# Patient Record
Sex: MALE | Race: AMERICAN INDIAN OR ALASKA NATIVE
[De-identification: names, ages, dates, MRNs, and addresses within clinical notes are randomized per-mention and may not be internally consistent; named-entity substitution may affect disease eponyms.]

---

## 2018-05-29 NOTE — EDM.PDOC
ED HPI GENERAL MEDICAL PROBLEM





- General


Chief Complaint: ENT Problem


Stated Complaint: [id number] POSSIBLY STREP THROAT


Time Seen by Provider: 05/29/18 18:54


Source of Information: Reports: Patient


History Limitations: Reports: No Limitations





- History of Present Illness


INITIAL COMMENTS - FREE TEXT/NARRATIVE: 





sore throat & cough since Friday, not getting better


  ** Throat


Pain Score (Numeric/FACES): 4





- Related Data


 Allergies











Allergy/AdvReac Type Severity Reaction Status Date / Time


 


No Known Allergies Allergy   Verified 03/05/16 08:39











Home Meds: 


 Home Meds





. [No Known Home Meds]  03/05/16 [History]











Past Medical History





- Past Health History


Medical/Surgical History: Denies Medical/Surgical History





Social & Family History





- Family History


Family Medical History: Noncontributory





- Tobacco Use


Smoking Status *Q: Never Smoker


Second Hand Smoke Exposure: Yes





- Caffeine Use


Caffeine Use: Reports: Soda





- Living Situation & Occupation


Living situation: Reports: Single, with Family


Occupation: Student





ED ROS ENT





- Review of Systems


Review Of Systems: ROS reveals no pertinent complaints other than HPI.





ED EXAM, ENT





- Physical Exam


Exam: See Below


Exam Limited By: No Limitations


General Appearance: Alert, WD/WN, Mild Distress, Other (upset)


Ears: Hearing Grossly Normal


Nose: Normal Inspection


Mouth/Throat: Pharyngeal Erythema, Tonsillar Erythema, Tonsillar Swelling


Head: Atraumatic


Neck: Non-Tender, Full Range of Motion


Respiratory/Chest: No Respiratory Distress, Lungs Clear, Normal Breath Sounds, 

No Accessory Muscle Use


Cardiovascular: Regular Rate, Rhythm


GI/Abdominal: Soft, Non-Tender


Neurological: Alert, Oriented, Normal Cognition, Normal Gait, No Motor/Sensory 

Deficits


Psychiatric: Flat Affect


Skin: Warm, Dry, Normal Color


Lymphatic: No Adenopathy





Course





- Vital Signs


Last Recorded V/S: 


 Last Vital Signs











Temp  37.0 C   05/29/18 18:34


 


Pulse  92   05/29/18 18:34


 


Resp  16   05/29/18 18:34


 


BP  140/75   05/29/18 18:34


 


Pulse Ox  100   05/29/18 18:34














- Orders/Labs/Meds


Orders: 


 Active Orders 24 hr











 Category Date Time Status


 


 CULTURE STREP A CONFIRMATION [RM] Stat Lab  05/29/18 18:43 Results


 


 STREP SCRN A RAPID W CULT CONF [RM] Stat Lab  05/29/18 18:43 Results


 


 Amoxicillin [Amoxil] Med  05/29/18 19:45 Once





 500 mg PO ONETIME ONE   














- Re-Assessments/Exams


Free Text/Narrative Re-Assessment/Exam: 





05/29/18 19:46


results discussed with pt





Departure





- Departure


Time of Disposition: 19:46


Disposition: Home, Self-Care 01


Condition: Good


Clinical Impression: 


 Tonsillitis








- Discharge Information


Instructions:  Tonsillitis, Easy-to-Read


Referrals: 


PCP,None [Primary Care Provider] - 


Forms:  ED Department Discharge


Additional Instructions: 


1) avoid solid foods next 48 hours


2) have liquids & soft foods.


3) follow up at clinic





rx given;


amox 250mg tid x 30





- My Orders


Last 24 Hours: 


My Active Orders





05/29/18 19:45


Amoxicillin [Amoxil]   500 mg PO ONETIME ONE 














- Assessment/Plan


Last 24 Hours: 


My Active Orders





05/29/18 19:45


Amoxicillin [Amoxil]   500 mg PO ONETIME ONE

## 2019-03-10 NOTE — EDM.PDOC
ED HPI GENERAL MEDICAL PROBLEM





- General


Chief Complaint: Respiratory Problem


Stated Complaint: THROAT AND LUNGS [id number]


Time Seen by Provider: 03/10/19 13:20


Source of Information: Reports: Patient


History Limitations: Reports: No Limitations





- History of Present Illness


INITIAL COMMENTS - FREE TEXT/NARRATIVE: 





been sick few days sister is sick, coughing sore throat feverish.





- Related Data


 Allergies











Allergy/AdvReac Type Severity Reaction Status Date / Time


 


No Known Allergies Allergy   Verified 03/10/19 12:04











Home Meds: 


 Home Meds





. [No Known Home Meds]  03/05/16 [History]











Past Medical History





- Past Health History


Medical/Surgical History: Denies Medical/Surgical History





Social & Family History





- Family History


Family Medical History: Noncontributory





- Tobacco Use


Smoking Status *Q: Never Smoker


Second Hand Smoke Exposure: Yes





- Caffeine Use


Caffeine Use: Reports: Soda





- Recreational Drug Use


Recreational Drug Use: No





- Living Situation & Occupation


Living situation: Reports: Single, with Family


Occupation: Student





ED ROS GENERAL





- Review of Systems


Review Of Systems: ROS reveals no pertinent complaints other than HPI.





ED EXAM, GENERAL





- Physical Exam


Exam: See Below


Exam Limited By: No Limitations


General Appearance: Alert, WD/WN, No Apparent Distress, Anxious


Ears: Hearing Grossly Normal


Throat/Mouth: Normal Voice, No Airway Compromise, Inflammation


Head: Atraumatic


Neck: Non-Tender, Full Range of Motion


Respiratory/Chest: No Respiratory Distress


Cardiovascular: Regular Rate, Rhythm


GI/Abdominal: Soft, Non-Tender


Neurological: Alert, Oriented, Normal Cognition, Normal Gait, No Motor/Sensory 

Deficits


Psychiatric: Flat Affect


Skin Exam: Warm, Dry, Normal Color


Lymphatic: No Adenopathy





Course





- Vital Signs


Last Recorded V/S: 


 Last Vital Signs











Temp  36.3 C   03/10/19 12:00


 


Pulse  89   03/10/19 12:00


 


Resp  18   03/10/19 12:00


 


BP  132/87   03/10/19 12:00


 


Pulse Ox  96   03/10/19 12:00














- Orders/Labs/Meds


Orders: 


 Active Orders 24 hr











 Category Date Time Status


 


 CULTURE STREP A CONFIRMATION [] Stat Lab  03/10/19 12:14 Results


 


 STREP SCRN A RAPID W CULT CONF [RM] Stat Lab  03/10/19 12:14 Results














- Re-Assessments/Exams


Free Text/Narrative Re-Assessment/Exam: 





03/10/19 13:30


results discussed with pt





Departure





- Departure


Time of Disposition: 13:31


Disposition: Home, Self-Care 01


Condition: Good


Clinical Impression: 


 Acute bronchitis with bronchospasm, Tonsillopharyngitis








- Discharge Information


Instructions:  Upper Respiratory Infection, Adult, Easy-to-Read


Forms:  ED Department Discharge


Additional Instructions: 


1) rest


2) drink lots of liquids


3) don't sleep flat at night





rx given;


albuterol 2.5mg solution tid prn


z-connie





- My Orders


Last 24 Hours: 


My Active Orders





03/10/19 12:14


CULTURE STREP A CONFIRMATION [RM] Stat 


STREP SCRN A RAPID W CULT CONF [RM] Stat 














- Assessment/Plan


Last 24 Hours: 


My Active Orders





03/10/19 12:14


CULTURE STREP A CONFIRMATION [RM] Stat 


STREP SCRN A RAPID W CULT CONF [RM] Stat

## 2020-10-17 ENCOUNTER — HOSPITAL ENCOUNTER (EMERGENCY)
Dept: HOSPITAL 43 - DL.ED | Age: 25
Discharge: HOME | End: 2020-10-17
Payer: MEDICAID

## 2020-10-17 DIAGNOSIS — U07.1: Primary | ICD-10-CM

## 2020-10-17 PROCEDURE — U0002 COVID-19 LAB TEST NON-CDC: HCPCS

## 2020-10-24 ENCOUNTER — HOSPITAL ENCOUNTER (INPATIENT)
Dept: HOSPITAL 43 - DL.ED | Age: 25
LOS: 2 days | Discharge: HOME | DRG: 177 | End: 2020-10-26
Attending: INTERNAL MEDICINE | Admitting: INTERNAL MEDICINE
Payer: MEDICAID

## 2020-10-24 DIAGNOSIS — U07.1: Primary | ICD-10-CM

## 2020-10-24 DIAGNOSIS — J12.89: ICD-10-CM

## 2020-10-24 LAB
ANION GAP SERPL CALC-SCNC: 12.1 MEQ/L (ref 7–13)
CHLORIDE SERPL-SCNC: 99 MMOL/L (ref 98–107)
SODIUM SERPL-SCNC: 136 MMOL/L (ref 136–145)

## 2020-10-24 PROCEDURE — XW033E5 INTRODUCTION OF REMDESIVIR ANTI-INFECTIVE INTO PERIPHERAL VEIN, PERCUTANEOUS APPROACH, NEW TECHNOLOGY GROUP 5: ICD-10-PCS | Performed by: INTERNAL MEDICINE

## 2020-10-24 PROCEDURE — 8E0ZXY6 ISOLATION: ICD-10-PCS | Performed by: INTERNAL MEDICINE

## 2020-10-24 RX ADMIN — HEPARIN SODIUM SCH UNITS: 5000 INJECTION, SOLUTION INTRAVENOUS; SUBCUTANEOUS at 23:09

## 2020-10-24 RX ADMIN — LEVOFLOXACIN SCH MLS/HR: 750 INJECTION, SOLUTION INTRAVENOUS at 23:07

## 2020-10-24 NOTE — EDM.PDOC
ED HPI GENERAL MEDICAL PROBLEM





- General


Stated Complaint: COVID - CHEST XRAY


Time Seen by Provider: 10/24/20 18:50


Source of Information: Reports: Patient


History Limitations: Reports: No Limitations





- History of Present Illness


INITIAL COMMENTS - FREE TEXT/NARRATIVE: 





positive covid last Saturday not getting better and now having SOB and chest 

hurts covid nurse told him to come to ER. 








PT FULL CODE





- Related Data


                                    Allergies











Allergy/AdvReac Type Severity Reaction Status Date / Time


 


No Known Allergies Allergy   Verified 03/10/19 12:04











Home Meds: 


                                    Home Meds





. [No Known Home Meds]  03/05/16 [History]











Past Medical History





- Past Health History


Medical/Surgical History: Denies Medical/Surgical History


HEENT History: Reports: None


Cardiovascular History: Reports: None


Respiratory History: Reports: None


Gastrointestinal History: Reports: None


Genitourinary History: Reports: None


Musculoskeletal History: Reports: None


Neurological History: Reports: None


Psychiatric History: Reports: None


Endocrine/Metabolic History: Reports: None


Hematologic History: Reports: None


Immunologic History: Reports: None


Oncologic (Cancer) History: Reports: None


Dermatologic History: Reports: None





- Infectious Disease History


Infectious Disease History: Reports: None





- Past Surgical History


Head Surgeries/Procedures: Reports: None





Social & Family History





- Family History


Family Medical History: Noncontributory





- Caffeine Use


Caffeine Use: Reports: Coffee





- Living Situation & Occupation


Living situation: Reports: Single, with Family


Occupation: Student





ED ROS GENERAL





- Review of Systems


Review Of Systems: Comprehensive ROS is negative, except as noted in HPI.





ED EXAM, GENERAL





- Physical Exam


Exam: See Below


Exam Limited By: No Limitations


General Appearance: Alert, WD/WN, Mild Distress, Other (discomfort)


Ears: Hearing Grossly Normal


Throat/Mouth: Normal Voice, No Airway Compromise


Head: Atraumatic


Neck: Non-Tender, Full Range of Motion


Respiratory/Chest: No Respiratory Distress, No Accessory Muscle Use, Rhonchi


Cardiovascular: Regular Rate, Rhythm


GI/Abdominal: Soft, Non-Tender


 (Male) Exam: Deferred


Rectal (Males) Exam: Deferred


Neurological: Alert, Oriented, Normal Cognition, Normal Gait, No Motor/Sensory D

eficits


Psychiatric: Normal Affect, Normal Mood


Skin Exam: Warm, Dry, Normal Color


Lymphatic: No Adenopathy





Course





- Vital Signs


Last Recorded V/S: 


                                Last Vital Signs











Temp  36.6 C   10/24/20 19:15


 


Pulse  98   10/24/20 19:15


 


Resp  20   10/24/20 19:15


 


BP  129/75   10/24/20 19:15


 


Pulse Ox  92 L  10/24/20 19:15














- Orders/Labs/Meds


Orders: 


                               Active Orders 24 hr











 Category Date Time Status


 


 CULTURE BLOOD [BC] Stat Lab  10/24/20 19:02 Received


 


 Sodium Chloride 0.9% [Normal Saline] 1,000 ml Med  10/24/20 18:45 Active





 IV ASDIRECTED   








                                Medication Orders





Sodium Chloride (Normal Saline)  1,000 mls @ 150 mls/hr IV ASDIRECTED MARIAMA


   Last Admin: 10/24/20 19:09  Dose: 150 mls/hr


   Documented by: ZHOU








Labs: 


                                Laboratory Tests











  10/24/20 10/24/20 10/24/20 Range/Units





  19:02 19:02 19:02 


 


WBC  6.0    (5.0-10.0)  10^3/uL


 


RBC  5.88    (4.6-6.2)  10^6/uL


 


Hgb  16.7    (14.0-18.0)  g/dL


 


Hct  47.5    (40.0-54.0)  %


 


MCV  80.8    ()  fL


 


MCH  28.4    (27.0-34.0)  pg


 


MCHC  35.2 H    (33.0-35.0)  g/dL


 


Plt Count  165    (150-450)  10^3/uL


 


Neut % (Auto)  66.8    (42.2-75.2)  %


 


Lymph % (Auto)  20.6    (20.5-50.1)  %


 


Mono % (Auto)  12.1 H    (2-8)  %


 


Eos % (Auto)  0.3 L    (1.0-3.0)  %


 


Baso % (Auto)  0.2    (0.0-1.0)  %


 


Add Manual Diff  Yes    


 


Neutrophils % (Manual)  63    (42-75)  %


 


Band Neutrophils %  4    %


 


Lymphocytes % (Manual)  22    (20-50)  %


 


Monocytes % (Manual)  10 H    (2-8)  %


 


Eosinophils % (Manual)  1    (1-3)  %


 


Sodium   136   (136-145)  mmol/L


 


Potassium   3.1 L   (3.5-5.1)  mmol/L


 


Chloride   99   ()  mmol/L


 


Carbon Dioxide   28   (21-32)  mmol/L


 


Anion Gap   12.1   (7-13)  mEq/L


 


BUN   21 H   (7-18)  mg/dL


 


Creatinine   0.99   (0.70-1.30)  mg/dL


 


Est Cr Clr Drug Dosing   TNP   


 


Estimated GFR (MDRD)   > 60   


 


BUN/Creatinine Ratio   21.2   (No establ ref range)  


 


Glucose   124 H   (74-99)  mg/dL


 


Lactic Acid    0.9  (0.4-2.0)  mmol/L


 


Calcium   8.3 L   (8.5-10.1)  mg/dL


 


Total Bilirubin   0.9   (0.2-1.0)  mg/dL


 


AST   37   (15-37)  U/L


 


ALT   41   (16-63)  U/L


 


Alkaline Phosphatase   88   ()  U/L


 


Troponin I     (0.000-0.056)  ng/mL


 


Total Protein   7.6   (6.4-8.2)  g/dL


 


Albumin   3.5   (3.4-5.0)  g/dL


 


Globulin   4.1   


 


Albumin/Globulin Ratio   0.9   














  10/24/20 Range/Units





  19:02 


 


WBC   (5.0-10.0)  10^3/uL


 


RBC   (4.6-6.2)  10^6/uL


 


Hgb   (14.0-18.0)  g/dL


 


Hct   (40.0-54.0)  %


 


MCV   ()  fL


 


MCH   (27.0-34.0)  pg


 


MCHC   (33.0-35.0)  g/dL


 


Plt Count   (150-450)  10^3/uL


 


Neut % (Auto)   (42.2-75.2)  %


 


Lymph % (Auto)   (20.5-50.1)  %


 


Mono % (Auto)   (2-8)  %


 


Eos % (Auto)   (1.0-3.0)  %


 


Baso % (Auto)   (0.0-1.0)  %


 


Add Manual Diff   


 


Neutrophils % (Manual)   (42-75)  %


 


Band Neutrophils %   %


 


Lymphocytes % (Manual)   (20-50)  %


 


Monocytes % (Manual)   (2-8)  %


 


Eosinophils % (Manual)   (1-3)  %


 


Sodium   (136-145)  mmol/L


 


Potassium   (3.5-5.1)  mmol/L


 


Chloride   ()  mmol/L


 


Carbon Dioxide   (21-32)  mmol/L


 


Anion Gap   (7-13)  mEq/L


 


BUN   (7-18)  mg/dL


 


Creatinine   (0.70-1.30)  mg/dL


 


Est Cr Clr Drug Dosing   


 


Estimated GFR (MDRD)   


 


BUN/Creatinine Ratio   (No establ ref range)  


 


Glucose   (74-99)  mg/dL


 


Lactic Acid   (0.4-2.0)  mmol/L


 


Calcium   (8.5-10.1)  mg/dL


 


Total Bilirubin   (0.2-1.0)  mg/dL


 


AST   (15-37)  U/L


 


ALT   (16-63)  U/L


 


Alkaline Phosphatase   ()  U/L


 


Troponin I  < 0.017  (0.000-0.056)  ng/mL


 


Total Protein   (6.4-8.2)  g/dL


 


Albumin   (3.4-5.0)  g/dL


 


Globulin   


 


Albumin/Globulin Ratio   











Meds: 


Medications











Generic Name Dose Route Start Last Admin





  Trade Name Freq  PRN Reason Stop Dose Admin


 


Sodium Chloride  1,000 mls @ 150 mls/hr  10/24/20 18:45  10/24/20 19:09





  Normal Saline  IV   150 mls/hr





  ASDIRECTED MARIAMA   Administration














Discontinued Medications














Generic Name Dose Route Start Last Admin





  Trade Name Freq  PRN Reason Stop Dose Admin


 


Dexamethasone  12 mg  10/24/20 18:45  10/24/20 19:09





  Dexamethasone  IVPUSH  10/24/20 18:46  12 mg





  ONETIME ONE   Administration














- Re-Assessments/Exams


Free Text/Narrative Re-Assessment/Exam: 





10/24/20 20:38


case discussed with Dr Kaiser who kindly admitted pt.





Departure





- Departure


Time of Disposition: 20:38


Disposition: Admitted As Inpatient 67


Condition: Good


Clinical Impression: 


 Pneumonia due to COVID-19 virus, Hypokalemia








- Discharge Information


Forms:  ED Department Discharge





Sepsis Event Note (ED)





- Focused Exam


Vital Signs: 


                                   Vital Signs











  Temp Pulse Resp BP Pulse Ox


 


 10/24/20 19:15  36.6 C  98  20  129/75  92 L














- My Orders


Last 24 Hours: 


My Active Orders





10/24/20 18:45


Sodium Chloride 0.9% [Normal Saline] 1,000 ml IV ASDIRECTED 





10/24/20 19:02


CULTURE BLOOD [BC] Stat 














- Assessment/Plan


Last 24 Hours: 


My Active Orders





10/24/20 18:45


Sodium Chloride 0.9% [Normal Saline] 1,000 ml IV ASDIRECTED 





10/24/20 19:02


CULTURE BLOOD [BC] Stat

## 2020-10-24 NOTE — CT
PROCEDURE INFORMATION: 

Exam: CT Chest Without Contrast 

Exam date and time: 10/24/2020 7:24 PM 

Age: 25 years old 

Clinical indication: Cough and shortness of breath; Additional info: R/O covid 

pneumonia 



TECHNIQUE: 

Imaging protocol: Computed tomography of the chest without contrast. 

Radiation optimization: All CT scans at this facility use at least one of these 

dose optimization techniques: automated exposure control; mA and/or kV 

adjustment per patient size (includes targeted exams where dose is matched to 

clinical indication); or iterative reconstruction. 



COMPARISON: 

No relevant prior studies available. 



FINDINGS: 

Lungs: Lung windows show bilateral ground-glass and alveolar opacities, mainly 

seen at the periphery of the lungs and especially in the lower lobes. The 

central airways are widely patent. 

Pleural space: There are no pleural effusions. There is no pneumothorax. 

Heart: The heart size is normal. There is no pericardial effusion. 

Aorta: The thoracic aorta is normal in caliber and contour. 

Lymph nodes: No pathologically enlarged mediastinal, hilar or axillary lymph 

nodes are identified. 



Bones/joints: There is normal alignment throughout the visualized portion of 

the spine. No acute fractures or aggressive bone lesions are identified. 

Soft tissues: Within normal limits. 



IMPRESSION: 

Bilateral peripheral ground-glass and alveolar opacities consistent with 

COVID-19 pneumonia.

## 2020-10-24 NOTE — PCM.HP
H&P History of Present Illness





- General


Date of Service: 10/24/20


Admit Problem/Dx: 


                           Admission Diagnosis/Problem





Admission Diagnosis/Problem      Positive COVID-19 with Pneumonia








Source of Information: Patient, Old Records


History Limitations: Reports: No Limitations





- History of Present Illness


Initial Comments - Free Text/Narative: 


This is  a 26 Y/O M with no significant past medical history came to ER with 

complain of increased shortness of breath enid chest heaviness, he is tested  

positive covid last Saturday ( 10/17/20) , has not received and treatment for 

COVID , not getting better and was told by covid nurse to come to ER. He had CT 

chest in ER and it showed B/L peripheral ground-glass and alveolar opacities 

consistent with COVID-19 pneumonia. He is now admitted for Covid-19 Pneumonia. 





Onset of Symptoms: Reports: Gradual


Duration of Symptoms: Reports: Day(s):


Quality: Reports: Pressure





- Related Data


Allergies/Adverse Reactions: 


                                    Allergies











Allergy/AdvReac Type Severity Reaction Status Date / Time


 


No Known Allergies Allergy   Verified 03/10/19 12:04











Home Medications: 


                                    Home Meds





. [No Known Home Meds]  03/05/16 [History]











Past Medical History





- Past Health History


Medical/Surgical History: Denies Medical/Surgical History


HEENT History: Reports: None


Cardiovascular History: Reports: None


Respiratory History: Reports: None


Gastrointestinal History: Reports: None


Genitourinary History: Reports: None


Musculoskeletal History: Reports: None


Neurological History: Reports: None


Psychiatric History: Reports: None


Endocrine/Metabolic History: Reports: None


Hematologic History: Reports: None


Immunologic History: Reports: None


Oncologic (Cancer) History: Reports: None


Dermatologic History: Reports: None





- Infectious Disease History


Infectious Disease History: Reports: None


Other Infectious Disease History: covid





- Past Surgical History


Head Surgeries/Procedures: Reports: None





Social & Family History





- Family History


Family Medical History: Noncontributory





- Tobacco Use


Tobacco Use Status *Q: Never Tobacco User


Second Hand Smoke Exposure: No





- Caffeine Use


Caffeine Use: Reports: Coffee





- Recreational Drug Use


Recreational Drug Use: No





- Living Situation & Occupation


Living situation: Reports: Single, with Family


Occupation: Student





H&P Review of Systems





- Review of Systems:


Review Of Systems: See Below


General: Reports: Fever (low grade), Weakness


HEENT: Denies: Headaches, Sinus Congestion, Sore Throat, Visual Changes


Cardiovascular: Denies: Chest Pain, Edema, Lightheadedness


Gastrointestinal: Denies: Diarrhea, Difficulty Swallowing, Nausea, Vomiting


Genitourinary: Denies: Dysuria, Burning, Urgency, Flank Pain


Musculoskeletal: Denies: Neck Pain, Shoulder Pain, Leg Pain, Muscle Stiffness


Skin: Denies: Cyanosis, Jaundice, Bruising, Pruritis, Rash


Psychiatric: Denies: Confusion, Anxiety


Neurological: Denies: Confusion, Tremors, Weakness


Hematologic/Lymphatic: Reports: No Symptoms


Immunologic: Reports: No Symptoms





Exam





- Exam


Exam: See Below





- Vital Signs


Vital Signs: 


                                Last Vital Signs











Temp  36.6 C   10/24/20 19:15


 


Pulse  99   10/24/20 20:37


 


Resp  20   10/24/20 20:37


 


BP  129/75   10/24/20 19:15


 


Pulse Ox  96   10/24/20 20:37











Weight: 99.79 kg





- Exam


Quality Assessment: Supplemental Oxygen, DVT Prophylaxis.  No: Urinary Catheter


General: Alert, Oriented, Cooperative


HEENT: Conjunctiva Clear, EOMI, Pupils Equal


Neck: Supple.  No: JVD, Thyromegaly


Lungs: Clear to Auscultation, Normal Respiratory Effort, Decreased Breath Sounds


Cardiovascular: Regular Rate, Regular Rhythm


GI/Abdominal Exam: Normal Bowel Sounds, No Distention.  No: Rigid, Rebound


 (Male) Exam: Deferred


Rectal (Males) Exam: Normal Exam, Normal Rectal Tone, Deferred


Back Exam: Normal Inspection, Full Range of Motion


Extremities: Normal Inspection, No Pedal Edema


Skin: Warm, Intact


Neurological: Cranial Nerves Intact, Reflexes Equal Bilateral


Neuro Extensive - Mental Status: Alert, Oriented x3, Normal Mood/Affect, Memory 

Intact


Neuro Extensive - Motor, Sensory, Reflexes: CN II-XII Intact, Normal Gait, 

Normal Reflexes


Psychiatric: Alert, Normal Affect, Normal Mood





- Patient Data


Lab Results Last 24 hrs: 


                         Laboratory Results - last 24 hr











  10/24/20 10/24/20 10/24/20 Range/Units





  19:02 19:02 19:02 


 


WBC  6.0    (5.0-10.0)  10^3/uL


 


RBC  5.88    (4.6-6.2)  10^6/uL


 


Hgb  16.7    (14.0-18.0)  g/dL


 


Hct  47.5    (40.0-54.0)  %


 


MCV  80.8    ()  fL


 


MCH  28.4    (27.0-34.0)  pg


 


MCHC  35.2 H    (33.0-35.0)  g/dL


 


Plt Count  165    (150-450)  10^3/uL


 


Neut % (Auto)  66.8    (42.2-75.2)  %


 


Lymph % (Auto)  20.6    (20.5-50.1)  %


 


Mono % (Auto)  12.1 H    (2-8)  %


 


Eos % (Auto)  0.3 L    (1.0-3.0)  %


 


Baso % (Auto)  0.2    (0.0-1.0)  %


 


Add Manual Diff  Yes    


 


Neutrophils % (Manual)  63    (42-75)  %


 


Band Neutrophils %  4    %


 


Lymphocytes % (Manual)  22    (20-50)  %


 


Monocytes % (Manual)  10 H    (2-8)  %


 


Eosinophils % (Manual)  1    (1-3)  %


 


Sodium   136   (136-145)  mmol/L


 


Potassium   3.1 L   (3.5-5.1)  mmol/L


 


Chloride   99   ()  mmol/L


 


Carbon Dioxide   28   (21-32)  mmol/L


 


Anion Gap   12.1   (7-13)  mEq/L


 


BUN   21 H   (7-18)  mg/dL


 


Creatinine   0.99   (0.70-1.30)  mg/dL


 


Est Cr Clr Drug Dosing   TNP   


 


Estimated GFR (MDRD)   > 60   


 


BUN/Creatinine Ratio   21.2   (No establ ref range)  


 


Glucose   124 H   (74-99)  mg/dL


 


Lactic Acid    0.9  (0.4-2.0)  mmol/L


 


Calcium   8.3 L   (8.5-10.1)  mg/dL


 


Total Bilirubin   0.9   (0.2-1.0)  mg/dL


 


AST   37   (15-37)  U/L


 


ALT   41   (16-63)  U/L


 


Alkaline Phosphatase   88   ()  U/L


 


Troponin I     (0.000-0.056)  ng/mL


 


Total Protein   7.6   (6.4-8.2)  g/dL


 


Albumin   3.5   (3.4-5.0)  g/dL


 


Globulin   4.1   


 


Albumin/Globulin Ratio   0.9   














  10/24/20 Range/Units





  19:02 


 


WBC   (5.0-10.0)  10^3/uL


 


RBC   (4.6-6.2)  10^6/uL


 


Hgb   (14.0-18.0)  g/dL


 


Hct   (40.0-54.0)  %


 


MCV   ()  fL


 


MCH   (27.0-34.0)  pg


 


MCHC   (33.0-35.0)  g/dL


 


Plt Count   (150-450)  10^3/uL


 


Neut % (Auto)   (42.2-75.2)  %


 


Lymph % (Auto)   (20.5-50.1)  %


 


Mono % (Auto)   (2-8)  %


 


Eos % (Auto)   (1.0-3.0)  %


 


Baso % (Auto)   (0.0-1.0)  %


 


Add Manual Diff   


 


Neutrophils % (Manual)   (42-75)  %


 


Band Neutrophils %   %


 


Lymphocytes % (Manual)   (20-50)  %


 


Monocytes % (Manual)   (2-8)  %


 


Eosinophils % (Manual)   (1-3)  %


 


Sodium   (136-145)  mmol/L


 


Potassium   (3.5-5.1)  mmol/L


 


Chloride   ()  mmol/L


 


Carbon Dioxide   (21-32)  mmol/L


 


Anion Gap   (7-13)  mEq/L


 


BUN   (7-18)  mg/dL


 


Creatinine   (0.70-1.30)  mg/dL


 


Est Cr Clr Drug Dosing   


 


Estimated GFR (MDRD)   


 


BUN/Creatinine Ratio   (No establ ref range)  


 


Glucose   (74-99)  mg/dL


 


Lactic Acid   (0.4-2.0)  mmol/L


 


Calcium   (8.5-10.1)  mg/dL


 


Total Bilirubin   (0.2-1.0)  mg/dL


 


AST   (15-37)  U/L


 


ALT   (16-63)  U/L


 


Alkaline Phosphatase   ()  U/L


 


Troponin I  < 0.017  (0.000-0.056)  ng/mL


 


Total Protein   (6.4-8.2)  g/dL


 


Albumin   (3.4-5.0)  g/dL


 


Globulin   


 


Albumin/Globulin Ratio   











Result Diagrams: 


                                 10/24/20 19:02





                                 10/24/20 19:02





- Problem List


(1) Pneumonia due to COVID-19 virus


SNOMED Code(s): 837676585179755942


   ICD Code: U07.1 - COVID-19; J12.89 - OTHER VIRAL PNEUMONIA   Status: Acute   

Current Visit: No   


Problem List Initiated/Reviewed/Updated: Yes


Orders Last 24hrs: 


                               Active Orders 24 hr











 Category Date Time Status


 


 Admission Diagnosis [ADT] Stat ADT  10/24/20 20:39 Ordered


 


 Admission Status [Patient Status] [ADT] Routine ADT  10/24/20 20:39 Active


 


 CULTURE BLOOD [BC] Stat Lab  10/24/20 19:02 Received


 


 Sodium Chloride 0.9% [Normal Saline] 1,000 ml Med  10/24/20 18:45 Active





 IV ASDIRECTED   








                                Medication Orders





Sodium Chloride (Normal Saline)  1,000 mls @ 150 mls/hr IV ASDIRECTED [facility]


   Last Admin: 10/24/20 19:09  Dose: 150 mls/hr


   Documented by: ZHOU








Assessment/Plan Comment:: 


This is  a 26 Y/O M with no significant past medical history came to ER with 

complain of increased shortness of breath enid chest heaviness, he is tested  

positive covid last Saturday ( 10/17/20) , has not received and treatment for 

COVID , not getting better and was told by covid nurse to come to ER. He had CT 

chest in ER and it showed B/L peripheral ground-glass and alveolar opacities 

consistent with COVID-19 pneumonia. He is now admitted for Covid-19 Pneumonia. 








Impression and Plan:


1. Covid-19 pneumonia: The pt was dxed with COVOD-19 on last Saturday ( 

10/17/20) and has not received any treatment but now his symptom getting worseCT

chest consistent with COVID-19 pneumonia


-Will admit in isolation room


-Will contiue NC oxygen to keep 02 sat >90


-Discuss with pt about starting treatment with Redisivir and Dexamethasone


-He has received a dose of Dexamethasone 12 mg IV push in ER


-Will also start Levofloxacin 750 mg IV daily


-Will check daily Covid Lab








2. Hypokalemia: Will give potassium chloride 40 meq now and another 20 meq 2 hrs

larer


-Recheck potassium in AM





3. DVT prophylaxis: Heparin 5000 units TID





Code Status: Full code

## 2020-10-25 LAB
ANION GAP SERPL CALC-SCNC: 11.1 MEQ/L (ref 7–13)
CHLORIDE SERPL-SCNC: 103 MMOL/L (ref 98–107)
SODIUM SERPL-SCNC: 139 MMOL/L (ref 136–145)

## 2020-10-25 RX ADMIN — HEPARIN SODIUM SCH UNITS: 5000 INJECTION, SOLUTION INTRAVENOUS; SUBCUTANEOUS at 13:44

## 2020-10-25 RX ADMIN — HEPARIN SODIUM SCH UNITS: 5000 INJECTION, SOLUTION INTRAVENOUS; SUBCUTANEOUS at 05:46

## 2020-10-25 RX ADMIN — DEXAMETHASONE SODIUM PHOSPHATE SCH MG: 4 INJECTION, SOLUTION INTRAMUSCULAR; INTRAVENOUS at 08:34

## 2020-10-25 RX ADMIN — LEVOFLOXACIN SCH MLS/HR: 750 INJECTION, SOLUTION INTRAVENOUS at 21:57

## 2020-10-25 RX ADMIN — HEPARIN SODIUM SCH UNITS: 5000 INJECTION, SOLUTION INTRAVENOUS; SUBCUTANEOUS at 21:57

## 2020-10-25 NOTE — PCM.PN
- General Info


Date of Service: 10/25/20


Admission Dx/Problem (Free Text): 


                           Admission Diagnosis/Problem





Admission Diagnosis/Problem      Positive COVID-19 with Pneumonia








Subjective Update: 


Pt was seen in Isolation room, He is doing well, No nausea or vomiting still on 

NC oxygen, no fever or chill





Functional Status: Reports: Pain Controlled, Tolerating Diet, Ambulating, 

Urinating





- Review of Systems


General: Reports: Malaise, Appetite (good).  Denies: Fever


HEENT: Denies: Headaches, Sinus Congestion, Sore Throat, Visual Changes


Pulmonary: Reports: Shortness of Breath.  Denies: Cough, Wheezing


Cardiovascular: Denies: Chest Pain, Dyspnea on Exertion, Lightheadedness


Gastrointestinal: Denies: Abdominal Pain, Difficulty Swallowing, Nausea, 

Vomiting


Genitourinary: Denies: Dysuria, Frequency, Flank Pain


Musculoskeletal: Denies: Neck Pain, Leg Pain, Foot Pain, Joint Swelling


Skin: Denies: Cyanosis, Jaundice, Diaphoresis, Bruising, Pruritis, Rash


Neurological: Denies: Confusion, Numbness, Tremors


Psychiatric: Denies: Confusion, Anxiety





- Patient Data


Vitals - Most Recent: 


                                Last Vital Signs











Temp  37.2 C   10/25/20 12:00


 


Pulse  89   10/25/20 12:00


 


Resp  20   10/25/20 12:00


 


BP  126/80   10/25/20 12:00


 


Pulse Ox  97   10/25/20 12:00











Weight - Most Recent: 99.79 kg


I&O - Last 24 Hours: 


                                 Intake & Output











 10/24/20 10/25/20 10/25/20





 22:59 06:59 14:59


 


Intake Total  1480 360


 


Balance  1480 360











Lab Results Last 24 Hours: 


                         Laboratory Results - last 24 hr











  10/24/20 10/24/20 10/24/20 Range/Units





  19:02 19:02 19:02 


 


WBC  6.0    (5.0-10.0)  10^3/uL


 


RBC  5.88    (4.6-6.2)  10^6/uL


 


Hgb  16.7    (14.0-18.0)  g/dL


 


Hct  47.5    (40.0-54.0)  %


 


MCV  80.8    ()  fL


 


MCH  28.4    (27.0-34.0)  pg


 


MCHC  35.2 H    (33.0-35.0)  g/dL


 


Plt Count  165    (150-450)  10^3/uL


 


Neut % (Auto)  66.8    (42.2-75.2)  %


 


Lymph % (Auto)  20.6    (20.5-50.1)  %


 


Mono % (Auto)  12.1 H    (2-8)  %


 


Eos % (Auto)  0.3 L    (1.0-3.0)  %


 


Baso % (Auto)  0.2    (0.0-1.0)  %


 


Add Manual Diff  Yes    


 


Neutrophils % (Manual)  63    (42-75)  %


 


Band Neutrophils %  4    %


 


Lymphocytes % (Manual)  22    (20-50)  %


 


Monocytes % (Manual)  10 H    (2-8)  %


 


Eosinophils % (Manual)  1    (1-3)  %


 


Sodium   136   (136-145)  mmol/L


 


Potassium   3.1 L   (3.5-5.1)  mmol/L


 


Chloride   99   ()  mmol/L


 


Carbon Dioxide   28   (21-32)  mmol/L


 


Anion Gap   12.1   (7-13)  mEq/L


 


BUN   21 H   (7-18)  mg/dL


 


Creatinine   0.99   (0.70-1.30)  mg/dL


 


Est Cr Clr Drug Dosing   TNP   


 


Estimated GFR (MDRD)   > 60   


 


BUN/Creatinine Ratio   21.2   (No establ ref range)  


 


Glucose   124 H   (74-99)  mg/dL


 


Lactic Acid    0.9  (0.4-2.0)  mmol/L


 


Calcium   8.3 L   (8.5-10.1)  mg/dL


 


Total Bilirubin   0.9   (0.2-1.0)  mg/dL


 


AST   37   (15-37)  U/L


 


ALT   41   (16-63)  U/L


 


Alkaline Phosphatase   88   ()  U/L


 


Troponin I     (0.000-0.056)  ng/mL


 


Total Protein   7.6   (6.4-8.2)  g/dL


 


Albumin   3.5   (3.4-5.0)  g/dL


 


Globulin   4.1   


 


Albumin/Globulin Ratio   0.9   














  10/24/20 10/25/20 10/25/20 Range/Units





  19:02 06:18 06:18 


 


WBC   3.6 L   (5.0-10.0)  10^3/uL


 


RBC   5.61   (4.6-6.2)  10^6/uL


 


Hgb   15.7   (14.0-18.0)  g/dL


 


Hct   45.7   (40.0-54.0)  %


 


MCV   81.5   ()  fL


 


MCH   28.0   (27.0-34.0)  pg


 


MCHC   34.4   (33.0-35.0)  g/dL


 


Plt Count   183   (150-450)  10^3/uL


 


Neut % (Auto)   63.6   (42.2-75.2)  %


 


Lymph % (Auto)   21.5   (20.5-50.1)  %


 


Mono % (Auto)   14.6 H   (2-8)  %


 


Eos % (Auto)   0.0 L   (1.0-3.0)  %


 


Baso % (Auto)   0.3   (0.0-1.0)  %


 


Add Manual Diff     


 


Neutrophils % (Manual)     (42-75)  %


 


Band Neutrophils %     %


 


Lymphocytes % (Manual)     (20-50)  %


 


Monocytes % (Manual)     (2-8)  %


 


Eosinophils % (Manual)     (1-3)  %


 


Sodium    139  (136-145)  mmol/L


 


Potassium    4.1  (3.5-5.1)  mmol/L


 


Chloride    103  ()  mmol/L


 


Carbon Dioxide    29  (21-32)  mmol/L


 


Anion Gap    11.1  (7-13)  mEq/L


 


BUN    20 H  (7-18)  mg/dL


 


Creatinine    0.88  (0.70-1.30)  mg/dL


 


Est Cr Clr Drug Dosing    150.49  


 


Estimated GFR (MDRD)    > 60  


 


BUN/Creatinine Ratio     (No establ ref range)  


 


Glucose    116 H  (74-99)  mg/dL


 


Lactic Acid     (0.4-2.0)  mmol/L


 


Calcium    8.3 L  (8.5-10.1)  mg/dL


 


Total Bilirubin     (0.2-1.0)  mg/dL


 


AST     (15-37)  U/L


 


ALT     (16-63)  U/L


 


Alkaline Phosphatase     ()  U/L


 


Troponin I  < 0.017    (0.000-0.056)  ng/mL


 


Total Protein     (6.4-8.2)  g/dL


 


Albumin     (3.4-5.0)  g/dL


 


Globulin     


 


Albumin/Globulin Ratio     











Med Orders - Current: 


                               Current Medications





Acetaminophen (Tylenol)  650 mg PO Q4H PRN


   PRN Reason: Pain (mild 1-3 )/fever


   Last Admin: 10/24/20 23:08 Dose:  650 mg


   Documented by: 


Dexamethasone (Dexamethasone)  6 mg IVPUSH DAILY [facility]


   Last Admin: 10/25/20 08:34 Dose:  6 mg


   Documented by: 


Docusate Sodium (Colace)  100 mg PO DAILY PRN


   PRN Reason: Constipation


Heparin Sodium (Porcine) (Heparin Sodium)  5,000 units SUBCUT Q8H [facility]


   Last Admin: 10/25/20 13:44 Dose:  5,000 units


   Documented by: 


Sodium Chloride (Normal Saline)  1,000 mls @ 150 mls/hr IV ASDIRECTED [facility]


   Last Admin: 10/25/20 05:45 Dose:  150 mls/hr


   Documented by: 


Levofloxacin/Dextrose 750 mg/ (Premix)  150 mls @ 100 mls/hr IV Q24H [facility]


   Last Admin: 10/24/20 23:07 Dose:  100 mls/hr


   Documented by: 


Remdesivir 100 mg/ Sodium (Chloride)  230 mls @ 230 mls/hr IV Q24H [facility]


   Stop: 10/28/20 21:59





Discontinued Medications





Dexamethasone (Dexamethasone)  12 mg IVPUSH ONETIME ONE


   Stop: 10/24/20 18:46


   Last Admin: 10/24/20 19:09 Dose:  12 mg


   Documented by: 


Remdesivir 200 mg/ Sodium (Chloride)  210 mls @ 210 mls/hr IV ONETIME ONE


   Stop: 10/24/20 22:11


   Last Admin: 10/24/20 23:06 Dose:  210 mls/hr


   Documented by: 


Potassium Chloride (Klor-Con 10)  40 meq PO ONETIME ONE


   Stop: 10/24/20 22:22


   Last Admin: 10/24/20 23:08 Dose:  40 meq


   Documented by: 











- Exam


Quality Assessment: Supplemental Oxygen, DVT Prophylaxis.  No: Central 

Line/PICC, Urine Catheter


General: Alert, Oriented, Cooperative, No Acute Distress


HEENT: Pupils Equal, Pupils Reactive, EOMI, Mucous Membr. Moist/Pink


Neck: Supple, No JVD, No Thyromegaly


Lungs: Clear to Auscultation, Normal Respiratory Effort, Decreased Breath Sounds


Cardiovascular: Regular Rate, Regular Rhythm, No Murmurs


GI/Abdominal Exam: Normal Bowel Sounds, Non-Tender, No Distention.  No: Rebound


 (Male) Exam: Deferred


Back Exam: Normal Inspection, Full Range of Motion


Extremities: Normal Inspection, No Pedal Edema


Skin: Warm, Dry, Intact


Neurological: No New Focal Deficit


Psy/Mental Status: Alert, Normal Affect, Normal Mood





Sepsis Event Note





- Evaluation


Sepsis Screening Result: No Definite Risk





- Focused Exam


Vital Signs: 


                                   Vital Signs











  Temp Pulse Resp BP BP Pulse Ox


 


 10/25/20 12:00  37.2 C  89  20  126/80   97


 


 10/25/20 08:00  36.7 C  83  20  120/74   96


 


 10/25/20 04:00  37.2 C  80  20   134/68  95














- Problem List & Annotations


(1) Pneumonia due to COVID-19 virus


SNOMED Code(s): 263725573855105325


   Code(s): U07.1 - COVID-19; J12.89 - OTHER VIRAL PNEUMONIA   Status: Acute   

Current Visit: No   





- Problem List Review


Problem List Initiated/Reviewed/Updated: Yes





- My Orders


Last 24 Hours: 


My Active Orders





10/24/20 21:47


Patient Status [ADT] Routine 


Up With Assistance [RC] ASDIRECTED 


Vital Signs [RC] 00,04,08,12,16,20 


Acetaminophen [TylenoL]   650 mg PO Q4H PRN 


Docusate Sodium [Colace]   100 mg PO DAILY PRN 


DVT Prophylaxis Not on Coumadin Therapy [AST] Routine 


DVT/VTE Prophylaxis Reflex [OM.PC] Routine 


Resuscitation Status Routine 





10/24/20 21:49


Oxygen Therapy [RC] CONTINUOUS 


Pulse Oximetry [RC] PRN 





10/24/20 21:50


Antiembolic Devices [RC] .Routine 


VTE/DVT Education [RC] PER UNIT ROUTINE 





10/24/20 22:00


Heparin Sodium   5,000 units SUBCUT Q8H 


Levofloxacin/Dextrose 5%-Water [Levaquin in D5W 750 MG/150 ML] 750 mg   Premix 

Bag 1 bag IV Q24H 





10/24/20 22:13


Isolation [COMM] Routine 





10/24/20 22:52


Incentive Spirometry [RT Incentive Spirometry] [RC] Q1HWA 





10/24/20 22:53


Flutter Valve Therapy [RT Chest Physiotherapy] [RC] Q1HWA 





10/25/20 09:00


dexAMETHasone [Dexamethasone]   6 mg IVPUSH DAILY 





10/25/20 21:00


Remdesivir (Eua) [Remdesivir (EUA)] 100 mg   Sodium Chloride 0.9% [Normal 

Saline] 230 ml IV Q24H 














- Plan


Plan:: 


This is  a 24 Y/O M with no significant past medical history came to ER with 

complain of increased shortness of breath enid chest heaviness, he is tested  

positive covid last Saturday ( 10/17/20) , has not received and treatment for 

COVID , not getting better and was told by covid nurse to come to ER. He had CT 

chest in ER and it showed B/L peripheral ground-glass and alveolar opacities 

consistent with COVID-19 pneumonia. He is now admitted for Covid-19 Pneumonia. 








Impression and Plan:


1. Covid-19 pneumonia: The pt was dxed with COVOD-19 on last Saturday ( 

10/17/20) and has not received any treatment but now his symptom getting worse 

CT chest consistent with COVID-19 pneumonia


--Will contiue NC oxygen to keep 02 sat >90 and wean off as tolerated


-Discuss with pt about starting treatment with Redisivir and Dexamethasone and 

he agreed to have those treatment


-He has received a dose of Dexamethasone 12 mg IV push in ER and will continue 

with 6 mg IV daily


-Will also continue Levofloxacin 750 mg IV daily


-Will check daily Covid Lab


-Will likely be able to go home on Monday








2. Hypokalemia: Has received potassium chloride 60 meq  and recheck potassium is

acceptable





3. DVT prophylaxis: Heparin 5000 units TID





Code Status: Full code

## 2020-10-26 VITALS — SYSTOLIC BLOOD PRESSURE: 120 MMHG | DIASTOLIC BLOOD PRESSURE: 75 MMHG | HEART RATE: 79 BPM

## 2020-10-26 RX ADMIN — DEXAMETHASONE SODIUM PHOSPHATE SCH MG: 4 INJECTION, SOLUTION INTRAMUSCULAR; INTRAVENOUS at 09:30

## 2020-10-26 RX ADMIN — HEPARIN SODIUM SCH UNITS: 5000 INJECTION, SOLUTION INTRAVENOUS; SUBCUTANEOUS at 06:04

## 2020-10-26 RX ADMIN — HEPARIN SODIUM SCH: 5000 INJECTION, SOLUTION INTRAVENOUS; SUBCUTANEOUS at 14:26

## 2020-10-26 NOTE — [id number].DCSUM1
**Discharge Summary





- Hospital Course


Free Text/Narrative:: 





25-year-old male with no significant past medical history came to ER with 

complain of increased shortness of breath and chest heaviness.  Patient had 

tested positive for COVID-19 on Saturday ( 10/17/20).  Patient was hypoxic and 

required up to 2 L of oxygen by nasal cannula.  CT scan showed bilateral 

peripheral groundglass and alveolar opacities consistent with COVID-19 

pneumonia.  Received treatment with remdesivir and dexamethasone.  Clinical 

status improved.





Diagnosis: Stroke: No





- Discharge Data


Discharge Date: 10/26/20


Discharge Disposition: Home, Self-Care 01


Condition: Good





- Referral to Home Health


Primary Care Physician: 


PCP None








- Discharge Plan


*PRESCRIPTION DRUG MONITORING PROGRAM REVIEWED*: Not Applicable


*COPY OF PRESCRIPTION DRUG MONITORING REPORT IN PATIENT BRAD: Not Applicable


Prescriptions/Med Rec: 


Amoxicillin/Clavulanate K [Augmentin 875-125 MG] 1 tab PO BID #10 tablet


dexAMETHasone [Dexamethasone] 6 mg PO DAILY #7 tab


Rivaroxaban [Xarelto] 10 mg PO DAILY #30 tab


Home Medications: 


                                    Home Meds





Amoxicillin/Clavulanate K [Augmentin 875-125 MG] 1 tab PO BID #10 tablet 

10/26/20 [Rx]


Rivaroxaban [Xarelto] 10 mg PO DAILY #30 tab 10/26/20 [Rx]


dexAMETHasone [Dexamethasone] 6 mg PO DAILY #7 tab 10/26/20 [Rx]








Patient Handouts:  COVID-19 Frequently Asked Questions, COVID-19: How to Protect

 Yourself and Others - CDC, Infection Prevention in the Home, Prevent the Spread

 of COVID-19 if You Are Sick - [facility]


Referrals: 


PCP,None [Primary Care Provider] - 





- Discharge Summary/Plan Comment


DC Time >30 min.: Yes





- General Info


Admission Dx/Problem (Free Text: 


                           Admission Diagnosis/Problem





Admission Diagnosis/Problem      Positive COVID-19 with Pneumonia








Subjective Update: 


Seen and examined today.  No longer requiring oxygen.  Still coughing.  Denies 

chest pain.  Afebrile overnight.





Functional Status: Reports: Pain Controlled





- Review of Systems


General: Reports: No Symptoms


HEENT: Reports: No Symptoms


Pulmonary: Reports: Cough


Cardiovascular: Reports: No Symptoms


Gastrointestinal: Reports: No Symptoms


Genitourinary: Reports: No Symptoms


Musculoskeletal: Reports: No Symptoms


Skin: Reports: No Symptoms


Neurological: Reports: No Symptoms


Psychiatric: Reports: No Symptoms





- Patient Data


Vitals - Most Recent: 


                                Last Vital Signs











Temp  98.3 F   10/26/20 07:56


 


Pulse  79   10/26/20 07:56


 


Resp  18   10/26/20 07:56


 


BP  120/75   10/26/20 07:56


 


Pulse Ox  95   10/26/20 07:56











Weight - Most Recent: 220 lb


I&O - Last 24 hours: 


                                 Intake & Output











 10/25/20 10/26/20 10/26/20





 22:59 06:59 14:59


 


Intake Total 240 750 


 


Balance 240 750 











Lab Results - Last 24 hrs: 


                         Laboratory Results - last 24 hr











  10/26/20 10/26/20 Range/Units





  11:28 11:28 


 


D-Dimer, Quantitative  276   (0-400)  ng/mL


 


Lactate Dehydrogenase   225  ()  U/L











TONO Results - Last 24 hrs: 


                                  Microbiology











 10/24/20 19:02 Aerobic Blood Culture - Preliminary





 Blood - Venous - Iv Start    NO GROWTH AFTER 1 DAY





 Anaerobic Blood Culture - Preliminary





    NO GROWTH AFTER 1 DAY











Med Orders - Current: 


                               Current Medications





Acetaminophen (Tylenol)  650 mg PO Q4H PRN


   PRN Reason: Pain (mild 1-3 )/fever


   Last Admin: 10/25/20 23:36 Dose:  650 mg


   Documented by: 


Dexamethasone (Dexamethasone)  6 mg IVPUSH DAILY [facility]


   Last Admin: 10/26/20 09:30 Dose:  6 mg


   Documented by: 


Docusate Sodium (Colace)  100 mg PO DAILY PRN


   PRN Reason: Constipation


Heparin Sodium (Porcine) (Heparin Sodium)  5,000 units SUBCUT Q8H [facility]


   Last Admin: 10/26/20 06:04 Dose:  5,000 units


   Documented by: 


Levofloxacin/Dextrose 750 mg/ (Premix)  150 mls @ 100 mls/hr IV Q24H [facility]


   Last Infusion: 10/25/20 23:50 Dose:  Infused


   Documented by: 


Remdesivir 100 mg/ Sodium (Chloride)  230 mls @ 230 mls/hr IV Q24H [facility]


   Stop: 10/28/20 21:59


   Last Infusion: 10/25/20 21:55 Dose:  Infused


   Documented by: 


Sodium Chloride (Saline Flush)  10 ml FLUSH DAILY PRN


   PRN Reason: IV Use


   Last Admin: 10/25/20 21:56 Dose:  10 ml


   Documented by: 


Sodium Chloride (Saline Flush)  30 ml FLUSH DAILY@2200 [facility]


   Stop: 10/28/20 22:01





Discontinued Medications





Dexamethasone (Dexamethasone)  12 mg IVPUSH ONETIME ONE


   Stop: 10/24/20 18:46


   Last Admin: 10/24/20 19:09 Dose:  12 mg


   Documented by: 


Sodium Chloride (Normal Saline)  1,000 mls @ 150 mls/hr IV ASDIRECTED [facility]


   Last Admin: 10/26/20 04:05 Dose:  150 mls/hr


   Documented by: 


Remdesivir 200 mg/ Sodium (Chloride)  210 mls @ 210 mls/hr IV ONETIME ONE


   Stop: 10/24/20 22:11


   Last Admin: 10/24/20 23:06 Dose:  210 mls/hr


   Documented by: 


Potassium Chloride (Klor-Con 10)  40 meq PO ONETIME ONE


   Stop: 10/24/20 22:22


   Last Admin: 10/24/20 23:08 Dose:  40 meq


   Documented by: 











- Exam


General: Reports: Alert, Oriented


HEENT: Reports: Pupils Equal, Pupils Reactive, EOMI, Mucous Membr. Moist/Pink


Neck: Reports: Supple


Lungs: Reports: Clear to Auscultation, Normal Respiratory Effort


Cardiovascular: Reports: Regular Rate, Regular Rhythm


GI/Abdominal Exam: Normal Bowel Sounds, Soft, Non-Tender, No Organomegaly, No 

Distention, No Abnormal Bruit, No Mass, Pelvis Stable


Back Exam: Reports: Normal Inspection, Full Range of Motion


Extremities: Normal Inspection, Normal Range of Motion, Non-Tender, No Pedal Pierre

ma, Normal Capillary Refill


Skin: Reports: Warm, Dry, Intact


Neurological: Reports: No New Focal Deficit


Psy/Mental Status: Reports: Alert, Normal Affect, Normal Mood

## 2021-12-25 ENCOUNTER — HOSPITAL ENCOUNTER (EMERGENCY)
Dept: HOSPITAL 43 - DL.ED | Age: 26
Discharge: HOME | End: 2021-12-25
Payer: COMMERCIAL

## 2021-12-25 VITALS — SYSTOLIC BLOOD PRESSURE: 156 MMHG | DIASTOLIC BLOOD PRESSURE: 89 MMHG | HEART RATE: 106 BPM

## 2021-12-25 DIAGNOSIS — J06.9: Primary | ICD-10-CM

## 2021-12-25 DIAGNOSIS — Z20.822: ICD-10-CM

## 2021-12-25 LAB — SARS-COV-2 RNA RESP QL NAA+PROBE: NEGATIVE

## 2021-12-25 PROCEDURE — 0240U: CPT

## 2021-12-25 PROCEDURE — 99283 EMERGENCY DEPT VISIT LOW MDM: CPT

## 2021-12-25 NOTE — EDM.PDOC
ED HPI GENERAL MEDICAL PROBLEM





- General


Chief Complaint: Fever


Stated Complaint: COUGH


Time Seen by Provider: 12/25/21 14:38


Source of Information: Reports: Patient, Old Records, RN, RN Notes Reviewed


History Limitations: Reports: No Limitations





- History of Present Illness


INITIAL COMMENTS - FREE TEXT/NARRATIVE: 


Pt presents to ER with c/o cough, fever, runny nose, and body aches that began 

yesterday. Denies chest pain, abdominal pain, N/V/D, or rash. He states he had 

COVID about 1 year ago.


Onset: Sudden


Onset Date: 12/24/21


Duration: Constant


Location: Reports: Generalized


Quality: Reports: Ache


Severity: Moderate


Improves with: Reports: None


Worsens with: Reports: None


Context: Reports: Sick Contact


Associated Symptoms: Reports: No Other Symptoms





- Related Data


                                    Allergies











Allergy/AdvReac Type Severity Reaction Status Date / Time


 


No Known Allergies Allergy   Verified 12/25/21 14:39














Past Medical History





- Past Health History


Medical/Surgical History: Denies Medical/Surgical History


HEENT History: Reports: None


Cardiovascular History: Reports: None


Respiratory History: Reports: None


Gastrointestinal History: Reports: None


Genitourinary History: Reports: None


Musculoskeletal History: Reports: None


Neurological History: Reports: None


Psychiatric History: Reports: None


Endocrine/Metabolic History: Reports: None


Hematologic History: Reports: None


Immunologic History: Reports: None


Oncologic (Cancer) History: Reports: None


Dermatologic History: Reports: None





- Infectious Disease History


Infectious Disease History: Reports: Novel Coronavirus


Other Infectious Disease History: covid





- Past Surgical History


Head Surgeries/Procedures: Reports: None





Social & Family History





- Family History


Family Medical History: No Pertinent Family History





- Caffeine Use


Caffeine Use: Reports: Coffee





- Living Situation & Occupation


Living situation: Reports: Single, with Family


Occupation: Student





ED ROS GENERAL





- Review of Systems


Review Of Systems: Comprehensive ROS is negative, except as noted in HPI.





ED EXAM, GENERAL





- Physical Exam


Exam: See Below


Exam Limited By: No Limitations


General Appearance: Alert, WD/WN, No Apparent Distress


Eye Exam: Bilateral Eye: Normal Inspection


Nose: Nasal Drainage, Clear Rhinorrhea


Throat/Mouth: Normal Inspection, Normal Lips, Normal Oropharynx, Normal Voice, 

No Airway Compromise


Head: Atraumatic, Normocephalic


Neck: Normal Inspection, Supple, Non-Tender, Full Range of Motion.  No: 

Lymphadenopathy (L), Lymphadenopathy (R)


Respiratory/Chest: No Respiratory Distress, Lungs Clear, Normal Breath Sounds, 

No Accessory Muscle Use, Chest Non-Tender, Other (Cough)


Cardiovascular: Regular Rate, Rhythm, No Edema


GI/Abdominal: Normal Bowel Sounds, Soft, Non-Tender


Back Exam: Normal Inspection


Extremities: Normal Inspection


Neurological: Alert, Oriented, No Motor/Sensory Deficits


Psychiatric: Normal Mood


Skin Exam: Warm, Dry, Intact, Normal Color, No Rash





Course





- Vital Signs


Last Recorded V/S: 


                                Last Vital Signs











Temp  98.3 F   12/25/21 14:42


 


Pulse  106 H  12/25/21 14:42


 


Resp  20   12/25/21 14:42


 


BP  156/89 H  12/25/21 14:42


 


Pulse Ox  98   12/25/21 14:42














- Orders/Labs/Meds


Labs: 


                                Laboratory Tests











  12/25/21 Range/Units





  14:22 


 


Influenza Type A RNA  Negative  (NEGATIVE)  


 


Influenza Type B RNA  Negative  (NEGATIVE)  


 


SARS-CoV-2 RNA (BERENICE)  Negative  (NEGATIVE)  














Departure





- Departure


Time of Disposition: 15:35


Disposition: Home, Self-Care 01


Condition: Good


Clinical Impression: 


 URI with cough and congestion








- Discharge Information


*PRESCRIPTION DRUG MONITORING PROGRAM REVIEWED*: Not Applicable


*COPY OF PRESCRIPTION DRUG MONITORING REPORT IN PATIENT BRAD: Not Applicable


Instructions:  Viral Respiratory Infection, Easy-To-Read


Forms:  ED Department Discharge


Additional Instructions: 


Rx: Loratadine-D 12HR


Frequent saltwater gargles until sore throat, congestion, and cough resolve.


Drink plenty of water, Pedialyte, or Gatorade.   


Follow up in clinic or return to ER if you develop any difficulty breathing.





Sepsis Event Note (ED)





- Focused Exam


Vital Signs: 


                                   Vital Signs











  Temp Pulse Resp BP Pulse Ox


 


 12/25/21 14:42  98.3 F  106 H  20  156/89 H  98

## 2022-11-24 ENCOUNTER — HOSPITAL ENCOUNTER (EMERGENCY)
Dept: HOSPITAL 43 - DL.ED | Age: 27
Discharge: HOME | End: 2022-11-24
Payer: MEDICAID

## 2022-11-24 VITALS — HEART RATE: 102 BPM | SYSTOLIC BLOOD PRESSURE: 144 MMHG | DIASTOLIC BLOOD PRESSURE: 95 MMHG

## 2022-11-24 DIAGNOSIS — U07.1: Primary | ICD-10-CM

## 2022-11-24 DIAGNOSIS — E66.9: ICD-10-CM

## 2022-11-24 LAB — SARS-COV-2 RNA RESP QL NAA+PROBE: POSITIVE

## 2022-11-24 PROCEDURE — 71045 X-RAY EXAM CHEST 1 VIEW: CPT

## 2022-11-24 PROCEDURE — 87081 CULTURE SCREEN ONLY: CPT

## 2022-11-24 PROCEDURE — 0240U: CPT

## 2022-11-24 PROCEDURE — 99283 EMERGENCY DEPT VISIT LOW MDM: CPT

## 2022-11-24 PROCEDURE — 87430 STREP A AG IA: CPT
